# Patient Record
Sex: MALE
[De-identification: names, ages, dates, MRNs, and addresses within clinical notes are randomized per-mention and may not be internally consistent; named-entity substitution may affect disease eponyms.]

---

## 2019-02-17 ENCOUNTER — HOSPITAL ENCOUNTER (EMERGENCY)
Dept: HOSPITAL 42 - ED | Age: 79
End: 2019-02-17
Payer: MEDICARE

## 2019-02-17 VITALS — BODY MASS INDEX: 29 KG/M2

## 2019-02-17 VITALS — TEMPERATURE: 97.4 F

## 2019-02-17 DIAGNOSIS — J45.909: ICD-10-CM

## 2019-02-17 DIAGNOSIS — I10: ICD-10-CM

## 2019-02-17 DIAGNOSIS — I46.9: Primary | ICD-10-CM

## 2019-02-17 DIAGNOSIS — E11.9: ICD-10-CM

## 2019-02-17 LAB
ALBUMIN SERPL-MCNC: 4.2 G/DL (ref 3–4.8)
ALBUMIN/GLOB SERPL: 1.1 {RATIO} (ref 1.1–1.8)
ALT SERPL-CCNC: 30 U/L (ref 7–56)
AST SERPL-CCNC: 55 U/L (ref 17–59)
BASOPHILS # BLD AUTO: 0.03 K/MM3 (ref 0–2)
BASOPHILS NFR BLD: 0.3 % (ref 0–3)
BNP SERPL-MCNC: 421 PG/ML (ref 0–450)
BUN SERPL-MCNC: 25 MG/DL (ref 7–21)
CALCIUM SERPL-MCNC: 10 MG/DL (ref 8.4–10.5)
CK MB SERPL-MCNC: 3.6 NG/ML (ref 0–3.6)
EOSINOPHIL # BLD: 0.1 10*3/UL (ref 0–0.7)
EOSINOPHIL NFR BLD: 0.9 % (ref 1.5–5)
ERYTHROCYTE [DISTWIDTH] IN BLOOD BY AUTOMATED COUNT: 13.1 % (ref 11.5–14.5)
GFR NON-AFRICAN AMERICAN: 39
HGB BLD-MCNC: 16.1 G/DL (ref 14–18)
LYMPHOCYTES # BLD: 4.9 10*3/UL (ref 1.2–3.4)
LYMPHOCYTES NFR BLD AUTO: 43.3 % (ref 22–35)
MCH RBC QN AUTO: 29.2 PG (ref 25–35)
MCHC RBC AUTO-ENTMCNC: 30.7 G/DL (ref 31–37)
MCV RBC AUTO: 95.1 FL (ref 80–105)
MONOCYTES # BLD AUTO: 0.3 10*3/UL (ref 0.1–0.6)
MONOCYTES NFR BLD: 3 % (ref 1–6)
PH BLDV: < 6.8 [PH] (ref 7.32–7.43)
PLATELET # BLD: 216 10^3/UL (ref 120–450)
PMV BLD AUTO: 11.1 FL (ref 7–11)
RBC # BLD AUTO: 5.51 10^6/UL (ref 3.5–6.1)
TROPONIN I SERPL-MCNC: 0.04 NG/ML
VENOUS BLOOD FIO2: 21 %
VENOUS BLOOD GAS PCO2: 141 (ref 40–60)
VENOUS BLOOD GAS PO2: 35 MM/HG (ref 30–55)
WBC # BLD AUTO: 11.3 10^3/UL (ref 4.5–11)

## 2019-02-17 NOTE — ED PDOC
Arrival/HPI





- General


Chief Complaint: Cardiac Arrest


Historian: Family, EMS





- History of Present Illness


Narrative History of Present Illness (Text): 


19 13:03


A 79 year old male, whose past medical history includes hypertension and asthma,

brought in by EMS to the emergency department for cardiac arrest. Patient 

arrived to the ER at 12:20. Per daughter patient told her he was experiencing 

shortness of breath for 2 hours prior to EMS arrival.  As daughter was calling 

911, patient went next door to have his blood pressure check, which was found to

be very low. Patient was told to sit down but he would not listen to his family.

Later on as they were awaiting for EMS arrival, patient laid down on the floor, 

as "he could not take the shortness of breath anymore," as per daughter. Per 

EMS, they arrived to the patient at 11:31. EMS intubated patient and patient 

coded after which they gave the patient 3 rounds of epi and 0.5 mg Atropine for 

bradycardia.  EMS reports a brief ROSC.  Patient arrived to the ED pulseless 

w/HUGO in place. 





PMD: Dr. Rocio Garvey











Past Medical History





- Provider Review


Nursing Documentation Reviewed: Yes





- Cardiac


Hx Cardiac Disorders: Yes


Hx Hypertension: Yes





- Pulmonary


Hx Respiratory Disorders: Yes


Hx Asthma: Yes





- Endocrine/Metabolic


Hx Endocrine Disorders: Yes


Hx Diabetes Mellitus Type 2: Yes





- Psychiatric


Hx Substance Use: No





Family/Social History





- Physician Review


Nursing Documentation Reviewed: Yes


Family/Social History: No Known Family HX


Smoking Status: Never Smoked


Hx Alcohol Use: No


Hx Substance Use: No





Allergies/Home Meds


Allergies/Adverse Reactions: 


Allergies





No Known Allergies Allergy (Verified 19 12:23)


   








Home Medications: 


                                    Home Meds











 Medication  Instructions  Recorded  Confirmed


 


RX: Unobtainable  19














Review of Systems





- Review of Systems


Systems not reviewed;Unavailable: Other (cardiac arrest)


Respiratory: SOB





Physical Exam





Vital Signs











  Temp Pulse BP


 


 19 12:46  97.4 F L  0 L 


 


 19 12:36    0/0 L














- Systems Exam


Pupils: Present: Non-Reactive, Pinpoint (6mm bilaterally)





Medical Decision Making


ED Course and Treatment: 


19 13:25


Impression: 78 year old male for cardiac arrest.  Patient was given Epi x 3, 

shock x 3 for v fib w/no ROSC.  Time of death was called at 12:41. See code 

sheet for more info.Time of death 12:41








Progress Notes:





Patient's daughter states patient had returned from [facility] on 2019.  





19 13:29


Case discussed with Dr. Rocio Garvey, patient's PCP, who requested to speak 

to patient's daughter.














- Lab Interpretations


Lab Results: 





                                        











pO2  35 mm/Hg (30-55)   19  12:30    


 


VBG pH  < 6.80  (7.32-7.43)  L*  19  12:30    


 


VBG pCO2  141.0  (40-60)  H*  19  12:30    


 


VBG O2 Sat (Calc)  34.7 % (40-65)  L  19  12:30    


 


VBG Potassium  5.2 mmol/L (3.6-5.2)   19  12:30    


 


Sodium  147.0 mmol/L (132-148)   19  12:30    


 


Chloride  99.0 mmol/L ()   19  12:30    


 


Glucose  174 mg/dl ()  H  19  12:30    


 


Lactate  18.8 mmol/L (0.7-2.1)  H*  19  12:30    


 


FiO2  21.0 %  19  12:30    


 


Blood Gas Comments  No orders   19  12:30    


 


Crit Value Called To  Madelyn godoy rn ed   19  12:30    


 


Crit Value Called By  Wesm   19  12:30    


 


Blood Gas Notified Time  1244   19  12:30    














- Scribe Statement


The provider has reviewed the documentation as recorded by the Karen Ruvalcaba





Provider Scribe Attestation:


All medical record entries made by the Scribe were at my direction and 

personally dictated by me. I have reviewed the chart and agree that the record 

accurately reflects my personal performance of the history, physical exam, 

medical decision making, and the department course for this patient. I have also

personally directed, reviewed, and agree with the discharge instructions and 

disposition.








Disposition/Present on Arrival





- Present on Arrival


Any Indicators Present on Arrival: No


History of DVT/PE: No


History of Uncontrolled Diabetes: No


Urinary Catheter: No


History of Decub. Ulcer: No


History Surgical Site Infection Following: None





- Disposition


Have Diagnosis and Disposition been Completed?: Yes


Diagnosis: 


 Cardiac arrest





Disposition:  WITH WITHOUT AUTOPSY


Disposition Time: 12:41


Condition: